# Patient Record
Sex: MALE | Race: WHITE | NOT HISPANIC OR LATINO | Employment: FULL TIME | URBAN - METROPOLITAN AREA
[De-identification: names, ages, dates, MRNs, and addresses within clinical notes are randomized per-mention and may not be internally consistent; named-entity substitution may affect disease eponyms.]

---

## 2017-06-01 ENCOUNTER — APPOINTMENT (OUTPATIENT)
Dept: RADIOLOGY | Facility: MEDICAL CENTER | Age: 32
End: 2017-06-01
Attending: EMERGENCY MEDICINE
Payer: MEDICAID

## 2017-06-01 ENCOUNTER — HOSPITAL ENCOUNTER (EMERGENCY)
Facility: MEDICAL CENTER | Age: 32
End: 2017-06-01
Attending: EMERGENCY MEDICINE
Payer: MEDICAID

## 2017-06-01 VITALS
SYSTOLIC BLOOD PRESSURE: 120 MMHG | HEART RATE: 42 BPM | TEMPERATURE: 98.6 F | BODY MASS INDEX: 27.93 KG/M2 | OXYGEN SATURATION: 97 % | RESPIRATION RATE: 14 BRPM | HEIGHT: 74 IN | DIASTOLIC BLOOD PRESSURE: 72 MMHG | WEIGHT: 217.59 LBS

## 2017-06-01 DIAGNOSIS — H54.7 VISUAL LOSS: ICD-10-CM

## 2017-06-01 DIAGNOSIS — R55 SYNCOPE, UNSPECIFIED SYNCOPE TYPE: ICD-10-CM

## 2017-06-01 DIAGNOSIS — Z86.69 HISTORY OF MIGRAINE: ICD-10-CM

## 2017-06-01 LAB
ALBUMIN SERPL BCP-MCNC: 4.6 G/DL (ref 3.2–4.9)
ALBUMIN/GLOB SERPL: 1.4 G/DL
ALP SERPL-CCNC: 62 U/L (ref 30–99)
ALT SERPL-CCNC: 17 U/L (ref 2–50)
AMPHET UR QL SCN: NEGATIVE
ANION GAP SERPL CALC-SCNC: 8 MMOL/L (ref 0–11.9)
APPEARANCE UR: CLEAR
AST SERPL-CCNC: 16 U/L (ref 12–45)
BARBITURATES UR QL SCN: NEGATIVE
BASOPHILS # BLD AUTO: 1 % (ref 0–1.8)
BASOPHILS # BLD: 0.07 K/UL (ref 0–0.12)
BENZODIAZ UR QL SCN: NEGATIVE
BILIRUB SERPL-MCNC: 0.8 MG/DL (ref 0.1–1.5)
BILIRUB UR QL STRIP.AUTO: NEGATIVE
BUN SERPL-MCNC: 11 MG/DL (ref 8–22)
BZE UR QL SCN: NEGATIVE
CALCIUM SERPL-MCNC: 9.7 MG/DL (ref 8.5–10.5)
CANNABINOIDS UR QL SCN: POSITIVE
CHLORIDE SERPL-SCNC: 106 MMOL/L (ref 96–112)
CO2 SERPL-SCNC: 25 MMOL/L (ref 20–33)
COLOR UR: YELLOW
CREAT SERPL-MCNC: 0.92 MG/DL (ref 0.5–1.4)
CRP SERPL HS-MCNC: 0.8 MG/L (ref 0–7.5)
CULTURE IF INDICATED INDCX: NO UA CULTURE
EOSINOPHIL # BLD AUTO: 0.14 K/UL (ref 0–0.51)
EOSINOPHIL NFR BLD: 2.1 % (ref 0–6.9)
ERYTHROCYTE [DISTWIDTH] IN BLOOD BY AUTOMATED COUNT: 37.8 FL (ref 35.9–50)
ERYTHROCYTE [SEDIMENTATION RATE] IN BLOOD BY WESTERGREN METHOD: 5 MM/HOUR (ref 0–15)
GFR SERPL CREATININE-BSD FRML MDRD: >60 ML/MIN/1.73 M 2
GLOBULIN SER CALC-MCNC: 3.3 G/DL (ref 1.9–3.5)
GLUCOSE SERPL-MCNC: 89 MG/DL (ref 65–99)
GLUCOSE UR STRIP.AUTO-MCNC: NEGATIVE MG/DL
HCT VFR BLD AUTO: 48.5 % (ref 42–52)
HGB BLD-MCNC: 17.7 G/DL (ref 14–18)
IMM GRANULOCYTES # BLD AUTO: 0.01 K/UL (ref 0–0.11)
IMM GRANULOCYTES NFR BLD AUTO: 0.1 % (ref 0–0.9)
KETONES UR STRIP.AUTO-MCNC: NEGATIVE MG/DL
LEUKOCYTE ESTERASE UR QL STRIP.AUTO: NEGATIVE
LV EJECT FRACT  99904: 60
LV EJECT FRACT MOD 2C 99903: 57.93
LV EJECT FRACT MOD 4C 99902: 64.14
LV EJECT FRACT MOD BP 99901: 61.27
LYMPHOCYTES # BLD AUTO: 1.81 K/UL (ref 1–4.8)
LYMPHOCYTES NFR BLD: 26.6 % (ref 22–41)
MCH RBC QN AUTO: 32 PG (ref 27–33)
MCHC RBC AUTO-ENTMCNC: 36.5 G/DL (ref 33.7–35.3)
MCV RBC AUTO: 87.7 FL (ref 81.4–97.8)
MDMA UR QL SCN: NEGATIVE
METHADONE UR QL SCN: NEGATIVE
MICRO URNS: NORMAL
MONOCYTES # BLD AUTO: 0.56 K/UL (ref 0–0.85)
MONOCYTES NFR BLD AUTO: 8.2 % (ref 0–13.4)
NEUTROPHILS # BLD AUTO: 4.22 K/UL (ref 1.82–7.42)
NEUTROPHILS NFR BLD: 62 % (ref 44–72)
NITRITE UR QL STRIP.AUTO: NEGATIVE
NRBC # BLD AUTO: 0 K/UL
NRBC BLD AUTO-RTO: 0 /100 WBC
OPIATES UR QL SCN: NEGATIVE
OXYCODONE UR QL SCN: NEGATIVE
PCP UR QL SCN: NEGATIVE
PH UR STRIP.AUTO: 6 [PH]
PLATELET # BLD AUTO: 238 K/UL (ref 164–446)
PMV BLD AUTO: 9.8 FL (ref 9–12.9)
POTASSIUM SERPL-SCNC: 3.9 MMOL/L (ref 3.6–5.5)
PROPOXYPH UR QL SCN: NEGATIVE
PROT SERPL-MCNC: 7.9 G/DL (ref 6–8.2)
PROT UR QL STRIP: NEGATIVE MG/DL
RBC # BLD AUTO: 5.53 M/UL (ref 4.7–6.1)
RBC UR QL AUTO: NEGATIVE
SODIUM SERPL-SCNC: 139 MMOL/L (ref 135–145)
SP GR UR STRIP.AUTO: 1.01
WBC # BLD AUTO: 6.8 K/UL (ref 4.8–10.8)

## 2017-06-01 PROCEDURE — 81003 URINALYSIS AUTO W/O SCOPE: CPT

## 2017-06-01 PROCEDURE — 86141 C-REACTIVE PROTEIN HS: CPT

## 2017-06-01 PROCEDURE — 93306 TTE W/DOPPLER COMPLETE: CPT | Mod: 26 | Performed by: INTERNAL MEDICINE

## 2017-06-01 PROCEDURE — 80053 COMPREHEN METABOLIC PANEL: CPT

## 2017-06-01 PROCEDURE — 93306 TTE W/DOPPLER COMPLETE: CPT

## 2017-06-01 PROCEDURE — 99284 EMERGENCY DEPT VISIT MOD MDM: CPT

## 2017-06-01 PROCEDURE — 70544 MR ANGIOGRAPHY HEAD W/O DYE: CPT

## 2017-06-01 PROCEDURE — 85025 COMPLETE CBC W/AUTO DIFF WBC: CPT

## 2017-06-01 PROCEDURE — 70547 MR ANGIOGRAPHY NECK W/O DYE: CPT

## 2017-06-01 PROCEDURE — 85652 RBC SED RATE AUTOMATED: CPT

## 2017-06-01 PROCEDURE — 36415 COLL VENOUS BLD VENIPUNCTURE: CPT

## 2017-06-01 PROCEDURE — 80307 DRUG TEST PRSMV CHEM ANLYZR: CPT

## 2017-06-01 ASSESSMENT — ENCOUNTER SYMPTOMS
HEADACHES: 1
FEVER: 0
TINGLING: 0
ABDOMINAL PAIN: 0
NAUSEA: 0
FOCAL WEAKNESS: 0
VOMITING: 0
FALLS: 1
DIARRHEA: 0

## 2017-06-01 ASSESSMENT — PAIN SCALES - GENERAL: PAINLEVEL_OUTOF10: 8

## 2017-06-01 NOTE — ED PROVIDER NOTES
"ED Provider Note    Scribed for Sanford Paul M.D. by Eric Hassan. 6/1/2017, 12:30 PM.    Primary care provider: Pcp Pt States None  Means of arrival: Walk In  History obtained from: Patient  History limited by: None     CHIEF COMPLAINT  Chief Complaint   Patient presents with   • Visual Problems     pt reports that he lost vision in left eye for unknown amount of time 1.5 hours ago. pt states that he has migraines got one, fell over and hit his head, +LOC for unknown amount of time.      HPI  Ronny MURPHY is a 31 y.o. male who presents to the Emergency Department complaining of headache. The patient had an onset of right sided headache when he woke up this morning at 9 AM. Patient rates his headache as being moderate in severity and constant since onset. The patient reports a chronic history of similar headaches, he estimates he has about 3 headaches per day every day. The patient reports he was walking in his house when he experienced a syncopal episode at 11 AM this morning. He experienced a ground level fall secondary to his syncopal episode, and states he hit the left side of his head when he fell. Patient has no complaints of pain resulting from his fall after his syncopal episode. Patient experienced loss of vision in his left eye that lasted for approximately \"5 seconds or 5 minutes\" after his syncopal episode. Patient states before the loss of vision he lost the ability to distinguish color then things like 3-D then loss of vision. Patient notes history of vision loss in the past but more television. Patient states his vision was if he was very drunk. Nose he did not drink last evening. Currently, patient states his left eye vision is at baseline. The patient denies any fever, congestion, nausea, vomiting, diarrhea, abdominal pain, focal weakness, tingling.     The patient notes a remote CT scan. He has been having headaches since age of 11 when he had atraumatic injury to his right globe with " "loss of vision. Patient's mother has history of migraines. Patient has not seen a neurologist in spite of the frequency of his headaches.    Review of old medical records shows evaluation of abdominal pain in September of last year. Evaluation for a abdominal pain as well with outpatient management in August. 2 previous evaluations for dental pain. Patient was seen for abdominal pain and discharged 2014.    REVIEW OF SYSTEMS  Review of Systems   Constitutional: Negative for fever.   HENT: Negative for congestion.    Eyes:        Loss of vision in left eye    Gastrointestinal: Negative for nausea, vomiting, abdominal pain and diarrhea.   Musculoskeletal: Positive for falls.   Neurological: Positive for headaches. Negative for tingling and focal weakness.        Syncope    All other systems reviewed and are negative.        PAST MEDICAL HISTORY   has a past medical history of Asthma.    SURGICAL HISTORY  patient denies any surgical history    SOCIAL HISTORY  Social History   Substance Use Topics   • Smoking status: Current Some Day Smoker -- 0.50 packs/day for 19 years     Types: Cigarettes   • Smokeless tobacco: Never Used   • Alcohol Use: No      History   Drug Use No     FAMILY HISTORY  No history pertinent to complaint.     CURRENT MEDICATIONS  Home Medications     **Home medications have not yet been reviewed for this encounter**          ALLERGIES  No Known Allergies    PHYSICAL EXAM  VITAL SIGNS: /78 mmHg  Pulse 55  Temp(Src) 36.3 °C (97.3 °F) (Temporal)  Resp 16  Ht 1.88 m (6' 2.02\")  Wt 98.7 kg (217 lb 9.5 oz)  BMI 27.93 kg/m2  SpO2 97%    Constitutional: Well developed, Well nourished, No acute distress, Non-toxic appearance.   HENT: Normocephalic, Atraumatic, Bilateral external ears normal, Oropharynx moist, no evidence of dehydration, No oral exudates, Nose normal.   Eyes: Right eye deviated laterally, Conjunctiva normal, No discharge.   Neck: Normal range of motion, No tenderness, Supple, No " stridor. No masses. No evidence of meningitis or meningismus.   Lymphatic: No lymphadenopathy noted.   Cardiovascular: Normal heart rate, Normal rhythm, No murmurs, No rubs, No gallops.   Thorax & Lungs: Normal breath sounds, No respiratory distress, No wheezing or rhonchi, No chest tenderness.   Abdomen: Bowel sounds normal, Soft, No tenderness, No masses, No pulsatile masses. No guarding or rebound. No evidence of peritoneal findings.  Skin: Warm, Dry, No erythema, No rash. No exanthem.   Extremities:  No edema, No tenderness, No cyanosis, No clubbing.   Musculoskeletal: Good range of motion in all major joints. No major deformities noted.   Neurologic: Alert & oriented x 3, Normal motor function, No focal deficits noted. Disconjugate right eye at baseline. EOMs intact with testing. Fundi benign. Blind right eye.  Psychiatric: Affect normal, mood normal.                                                              DIAGNOSTIC STUDIES / PROCEDURES    LABS  Results for orders placed or performed during the hospital encounter of 06/01/17   CBC WITH DIFFERENTIAL   Result Value Ref Range    WBC 6.8 4.8 - 10.8 K/uL    RBC 5.53 4.70 - 6.10 M/uL    Hemoglobin 17.7 14.0 - 18.0 g/dL    Hematocrit 48.5 42.0 - 52.0 %    MCV 87.7 81.4 - 97.8 fL    MCH 32.0 27.0 - 33.0 pg    MCHC 36.5 (H) 33.7 - 35.3 g/dL    RDW 37.8 35.9 - 50.0 fL    Platelet Count 238 164 - 446 K/uL    MPV 9.8 9.0 - 12.9 fL    Neutrophils-Polys 62.00 44.00 - 72.00 %    Lymphocytes 26.60 22.00 - 41.00 %    Monocytes 8.20 0.00 - 13.40 %    Eosinophils 2.10 0.00 - 6.90 %    Basophils 1.00 0.00 - 1.80 %    Immature Granulocytes 0.10 0.00 - 0.90 %    Nucleated RBC 0.00 /100 WBC    Neutrophils (Absolute) 4.22 1.82 - 7.42 K/uL    Lymphs (Absolute) 1.81 1.00 - 4.80 K/uL    Monos (Absolute) 0.56 0.00 - 0.85 K/uL    Eos (Absolute) 0.14 0.00 - 0.51 K/uL    Baso (Absolute) 0.07 0.00 - 0.12 K/uL    Immature Granulocytes (abs) 0.01 0.00 - 0.11 K/uL    NRBC (Absolute) 0.00  K/uL   COMP METABOLIC PANEL   Result Value Ref Range    Sodium 139 135 - 145 mmol/L    Potassium 3.9 3.6 - 5.5 mmol/L    Chloride 106 96 - 112 mmol/L    Co2 25 20 - 33 mmol/L    Anion Gap 8.0 0.0 - 11.9    Glucose 89 65 - 99 mg/dL    Bun 11 8 - 22 mg/dL    Creatinine 0.92 0.50 - 1.40 mg/dL    Calcium 9.7 8.5 - 10.5 mg/dL    AST(SGOT) 16 12 - 45 U/L    ALT(SGPT) 17 2 - 50 U/L    Alkaline Phosphatase 62 30 - 99 U/L    Total Bilirubin 0.8 0.1 - 1.5 mg/dL    Albumin 4.6 3.2 - 4.9 g/dL    Total Protein 7.9 6.0 - 8.2 g/dL    Globulin 3.3 1.9 - 3.5 g/dL    A-G Ratio 1.4 g/dL   URINALYSIS CULTURE, IF INDICATED   Result Value Ref Range    Color Yellow     Character Clear     Specific Gravity 1.010 <1.035    Ph 6.0 5.0-8.0    Glucose Negative Negative mg/dL    Ketones Negative Negative mg/dL    Protein Negative Negative mg/dL    Bilirubin Negative Negative    Nitrite Negative Negative    Leukocyte Esterase Negative Negative    Occult Blood Negative Negative    Micro Urine Req see below     Culture Indicated No UA Culture   URINE DRUG SCREEN   Result Value Ref Range    Amphetamines Urine Negative Negative    Barbiturates Negative Negative    Benzodiazepines Negative Negative    Cocaine Metabolite Negative Negative    Methadone Negative Negative    Ecstasy Negative Negative    Opiates Negative Negative    Oxycodone Negative Negative    Phencyclidine -Pcp Negative Negative    Propoxyphene Negative Negative    Cannabinoid Metab Positive (A) Negative   WESTERGREN SED RATE   Result Value Ref Range    Sed Rate Westergren 5 0 - 15 mm/hour   CRP HIGH SENSITIVE (CARDIAC)   Result Value Ref Range    C Reactive Protein High Sensitive 0.8 0.0 - 7.5 mg/L   ESTIMATED GFR   Result Value Ref Range    GFR If African American >60 >60 mL/min/1.73 m 2    GFR If Non African American >60 >60 mL/min/1.73 m 2   ECHOCARDIOGRAM COMP W/O CONT   Result Value Ref Range    Eject.Frac. MOD BP 61.27     Eject.Frac. MOD 4C 64.14     Eject.Frac. MOD 2C 57.93      Left Ventrical Ejection Fraction 60       All labs reviewed by me.       RADIOLOGY  ECHOCARDIOGRAM COMP W/O CONT   Final Result      MR-MRA NECK-W/O   Final Result   Addendum 1 of 1   Addendum:   The supra and infratentorial brain parenchyma appear normal on axial    T2-weighted sequences. The visualized flow voids of the cerebral vessels    are unremarkable. A Byron cisterna magna is seen.      Final      There is no stenosis in the visualized extracranial neck arteries.      MR-VENOGRAM (MRV) HEAD   Final Result      There is no evidence of deep venous sinus thrombosis.          The radiologist's interpretation of all radiological studies have been reviewed by me.    COURSE & MEDICAL DECISION MAKING  Nursing notes, VS, PMSFHx reviewed in chart.    Review of old medical records shows visit in September of last year for generalized abdominal pain. Evaluation for abdominal pain and diarrhea of the month before. Patient was seen for dental pain 2015 and abdominal pain 2014. Seen for facial swelling and dental abscess in 2013.    12:30 PM - Patient seen and examined at bedside. . Ordered MR MRA Head, MR MRA Neck, westergren sed rate, CRP, urine drug screen, CBC, CMP, urinalysis to evaluate his symptoms. The differential diagnoses include but are not limited to: Temporal arteritis, atypical migraine, embolic event, amaurosis fugax.    3:09 PM Spoke with Dr. Sousa, Cardiology, about the patient's condition. Dr. Sousa agrees patient be evaluated by EEG.      3:55 PM Discussed with patient admission for consultation. Possible need for anticoagulation. Significant abnormality and MRI waiting echo.    Patient states he cannot be admitted as there is no one to care for his son.    4:15 PM discussed case with Dr. Yi. He agreed with MRI. As he has had occurred after he struck his head he believes most likely ocular.    4:16 called ophthalmology for consultation.    4:23 PM Spoke with Dr. Zheng, Opthalmology, about  the patient's condition. He will contact the patient and patient will follow-up in the office.    I discussed need for admission to workup syncope. Patient did have consultation with ophthalmology and neurology over the phone. Patient agrees to return if return of symptoms. Patient is aware of my concern the patient's had difficulty with his vision is only one remaining eye.    Another possibility is complex migraine as he has had visual disturbance and tunnel vision in the past with his left eye.    Patient will be discharged AMA. Patient agrees to return if return of symptoms. Close ophthalmology follow-up. No evidence of occlusion to the carotid artery or abnormality on echocardiogram.      FINAL IMPRESSION  1. Visual loss    2. History of migraine    3. Syncope, unspecified syncope type            I, Eric Hassan (Scribe), am scribing for, and in the presence of, Sanford Paul M.D..    Electronically signed by: Eric Hassan (Jacquiibe), 6/1/2017    ISanford M.D. personally performed the services described in this documentation, as scribed by Eric Hassan in my presence, and it is both accurate and complete.    The note accurately reflects work and decisions made by me.  Sanford Paul  6/1/2017  4:38 PM

## 2017-06-01 NOTE — ED AVS SNAPSHOT
"[employer] Access Code: [email or web address]  Expires: 7/1/2017  4:45 PM    "[employer]  A secure, online tool to manage your health information     [employer]’s "[employer]® is a secure, online tool that connects you to your personalized health information from the privacy of your home -- day or night - making it very easy for you to manage your healthcare. Once the activation process is completed, you can even access your medical information using the "[employer] waldo, which is available for free in the Apple Waldo store or Google Play store.     "[employer] provides the following levels of access (as shown below):   My Chart Features   [facility] Primary Care Doctor [facility]  Specialists [facility]  Urgent  Care Non-[facility]  Primary Care  Doctor   Email your healthcare team securely and privately 24/7 X X X X   Manage appointments: schedule your next appointment; view details of past/upcoming appointments X      Request prescription refills. X      View recent personal medical records, including lab and immunizations X X X X   View health record, including health history, allergies, medications X X X X   Read reports about your outpatient visits, procedures, consult and ER notes X X X X   See your discharge summary, which is a recap of your hospital and/or ER visit that includes your diagnosis, lab results, and care plan. X X       How to register for "[employer]:  1. Go to  [email or web address].  2. Click on the Sign Up Now box, which takes you to the New Member Sign Up page. You will need to provide the following information:  a. Enter your "[employer] Access Code exactly as it appears at the top of this page. (You will not need to use this code after you’ve completed the sign-up process. If you do not sign up before the expiration date, you must request a new code.)   b. Enter your date of birth.   c. Enter your home email address.   d. Click Submit, and follow the next screen’s instructions.  3. Create a "[employer] ID. This will be your "[employer]  login ID and cannot be changed, so think of one that is secure and easy to remember.  4. Create a [employer] password. You can change your password at any time.  5. Enter your Password Reset Question and Answer. This can be used at a later time if you forget your password.   6. Enter your e-mail address. This allows you to receive e-mail notifications when new information is available in [employer].  7. Click Sign Up. You can now view your health information.    For assistance activating your [employer] account, call [phone]

## 2017-06-01 NOTE — ED AVS SNAPSHOT
6/1/2017    Ronny MURPHY  [address] [facility] [address]    Dear Ronny:    [facility] wants to ensure your discharge home is safe and you or your loved ones have had all of your questions answered regarding your care after you leave the hospital.    Below is a list of resources and contact information should you have any questions regarding your hospital stay, follow-up instructions, or active medical symptoms.    Questions or Concerns Regarding… Contact   Medical Questions Related to Your Discharge  (7 days a week, 8am-5pm) Contact a Nurse Care Coordinator   [phone]   Medical Questions Not Related to Your Discharge  (24 hours a day / 7 days a week)  Contact the Nurse Health Line   [phone]    Medications or Discharge Instructions Refer to your discharge packet   or contact your [facility] Primary Care Provider   [phone]   Follow-up Appointment(s) Schedule your appointment via [employer]   or contact Scheduling [phone]   Billing Review your statement via [employer]  or contact Billing [phone]   Medical Records Review your records via [employer]   or contact Medical Records [phone]     You may receive a telephone call within two days of discharge. This call is to make certain you understand your discharge instructions and have the opportunity to have any questions answered. You can also easily access your medical information, test results and upcoming appointments via the [employer] free online health management tool. You can learn more and sign up at [email or web address]. For assistance setting up your [employer] account, please call [phone].    Once again, we want to ensure your discharge home is safe and that you have a clear understanding of any next steps in your care. If you have any questions or concerns, please do not hesitate to contact us, we are here for you. Thank you for choosing [facility] for your healthcare needs.    Sincerely,    Your [facility] Healthcare Team

## 2017-06-01 NOTE — ED NOTES
Pt denies questions.  SL removed.  Verbalizes understanding of dc and f/u instructions. Released amb to self.

## 2017-06-01 NOTE — ED NOTES
"Chief Complaint   Patient presents with   • Visual Problems     pt reports that he lost vision in left eye for unknown amount of time 1.5 hours ago. pt states that he has migraines got one, fell over and hit his head, +LOC for unknown amount of time.      Pt wearing dark glasses, reports current head pain 8/10.  GCS 15.   Blood pressure 129/78, pulse 55, temperature 36.3 °C (97.3 °F), temperature source Temporal, resp. rate 16, height 1.88 m (6' 2.02\"), weight 98.7 kg (217 lb 9.5 oz), SpO2 97 %.  Pt back to 67.   "

## 2017-06-01 NOTE — ED AVS SNAPSHOT
Home Care Instructions                                                                                                                Ronny MURPHY   MRN: [id number]    Department:  [facility], Emergency Dept   Date of Visit:  6/1/2017            [facility], Emergency Dept    [address]    [facility] [address]    Phone:  [phone]      You were seen by     Sanford Paul M.D.      Your Diagnosis Was     Visual loss     H54.7       Follow-up Information     1. Follow up with Kameron Correa M.D.. Schedule an appointment as soon as possible for a visit in 1 day.    Specialty:  Ophthalmology    Contact information    [id number]  [facility] [address]  [phone]        Medication Information     Review all of your home medications and newly ordered medications with your primary doctor and/or pharmacist as soon as possible. Follow medication instructions as directed by your doctor and/or pharmacist.     Please keep your complete medication list with you and share with your physician. Update the information when medications are discontinued, doses are changed, or new medications (including over-the-counter products) are added; and carry medication information at all times in the event of emergency situations.               Medication List      ASK your doctor about these medications        Instructions    Morning Afternoon Evening Bedtime    loperamide 2 MG Caps   Commonly known as:  IMODIUM        Take 1 Cap by mouth 4 times a day as needed for Diarrhea.   Dose:  2 mg                        ondansetron 4 MG Tbdp   Commonly known as:  ZOFRAN ODT        Take 1 Tab by mouth every 8 hours as needed for Nausea/Vomiting.   Dose:  4 mg                                Procedures and tests performed during your visit     CBC WITH DIFFERENTIAL    COMP METABOLIC PANEL    CRP HIGH SENSITIVE (CARDIAC)    ECHOCARDIOGRAM COMP W/O CONT    ESTIMATED GFR    MR-MRA NECK-W/O    MR-VENOGRAM (MRV) HEAD    URINALYSIS CULTURE, IF INDICATED    URINE DRUG SCREEN    WESTERGREN SED RATE            Patient Information     Patient Information    Following emergency treatment: all patient requiring follow-up care must return either to a private physician or a clinic if your condition worsens before you are able to obtain further medical attention, please return to the emergency room.     Billing Information    At [facility], we work to make the billing process streamlined for our patients.  Our Representatives are here to answer any questions you may have regarding your hospital bill.  If you have insurance coverage and have supplied your insurance information to us, we will submit a claim to your insurer on your behalf.  Should you have any questions regarding your bill, we can be reached online or by phone as follows:  Online: You are able pay your bills online or live chat with our representatives about any billing questions you may have. We are here to help Monday - Friday from 8:00am to 7:30pm and 9:00am - 12:00pm on Saturdays.  Please visit [email or web address]  for more information.   Phone:  [phone] or [phone]    Please note that your emergency physician, surgeon, pathologist, radiologist, anesthesiologist, and other specialists are not employed by [facility] and will therefore bill separately for their services.  Please contact them directly for any questions concerning their bills at the numbers below:     Emergency Physician Services:  [phone]  [facility]:  [phone]  [facility]:  [phone]  [facility]:  [phone]    1. Your final bill may vary from the amount quoted upon discharge if all procedures are not complete at that time, or if your doctor has additional procedures of which we are not aware. You will receive an additional bill if you return to the Emergency Department at  [facility] for suture removal regardless of the facility of which the sutures were placed.     2. Please arrange for settlement of this account at the emergency registration.    3. All self-pay accounts are due in full at the time of treatment.  If you are unable to meet this obligation then payment is expected within 4-5 days.     4. If you have had radiology studies (CT, X-ray, Ultrasound, MRI), you have received a preliminary result during your emergency department visit. Please contact the radiology department [phone] to receive a copy of your final result. Please discuss the Final result with your primary physician or with the follow up physician provided.     Crisis Hotline:  [place] Crisis Hotline:  5-549-LNIHCJH or 1-805.437.2930  [place] Crisis Hotline:    1-924.858.6342 or 605-888-5013         ED Discharge Follow Up Questions    1. In order to provide you with very good care, we would like to follow up with a phone call in the next few days.  May we have your permission to contact you?     YES /  NO    2. What is the best phone number to call you? (       )_____-__________    3. What is the best time to call you?      Morning  /  Afternoon  /  Evening                   Patient Signature:  ____________________________________________________________    Date:  ____________________________________________________________

## 2018-12-04 ENCOUNTER — HOSPITAL ENCOUNTER (EMERGENCY)
Dept: HOSPITAL 8 - ED | Age: 33
Discharge: HOME | End: 2018-12-04
Payer: COMMERCIAL

## 2018-12-04 VITALS — BODY MASS INDEX: 27.01 KG/M2 | HEIGHT: 74 IN | WEIGHT: 210.43 LBS

## 2018-12-04 VITALS — DIASTOLIC BLOOD PRESSURE: 52 MMHG | SYSTOLIC BLOOD PRESSURE: 127 MMHG

## 2018-12-04 DIAGNOSIS — J45.31: Primary | ICD-10-CM

## 2018-12-04 DIAGNOSIS — J00: ICD-10-CM

## 2018-12-04 PROCEDURE — 99283 EMERGENCY DEPT VISIT LOW MDM: CPT

## 2018-12-04 PROCEDURE — 94640 AIRWAY INHALATION TREATMENT: CPT

## 2018-12-04 PROCEDURE — 71046 X-RAY EXAM CHEST 2 VIEWS: CPT

## 2024-07-15 ENCOUNTER — APPOINTMENT (OUTPATIENT)
Dept: RADIOLOGY | Facility: MEDICAL CENTER | Age: 39
End: 2024-07-15
Attending: EMERGENCY MEDICINE

## 2024-07-15 ENCOUNTER — HOSPITAL ENCOUNTER (EMERGENCY)
Facility: MEDICAL CENTER | Age: 39
End: 2024-07-15
Attending: EMERGENCY MEDICINE
Payer: MEDICAID

## 2024-07-15 VITALS
HEIGHT: 74 IN | RESPIRATION RATE: 14 BRPM | WEIGHT: 223.77 LBS | HEART RATE: 74 BPM | TEMPERATURE: 98.1 F | DIASTOLIC BLOOD PRESSURE: 70 MMHG | SYSTOLIC BLOOD PRESSURE: 125 MMHG | OXYGEN SATURATION: 97 % | BODY MASS INDEX: 28.72 KG/M2

## 2024-07-15 DIAGNOSIS — R07.9 ACUTE CHEST PAIN: ICD-10-CM

## 2024-07-15 LAB
ALBUMIN SERPL BCP-MCNC: 4.6 G/DL (ref 3.2–4.9)
ALBUMIN/GLOB SERPL: 1.7 G/DL
ALP SERPL-CCNC: 76 U/L (ref 30–99)
ALT SERPL-CCNC: 25 U/L (ref 2–50)
ANION GAP SERPL CALC-SCNC: 13 MMOL/L (ref 7–16)
AST SERPL-CCNC: 18 U/L (ref 12–45)
BASOPHILS # BLD AUTO: 0.8 % (ref 0–1.8)
BASOPHILS # BLD: 0.06 K/UL (ref 0–0.12)
BILIRUB SERPL-MCNC: 0.6 MG/DL (ref 0.1–1.5)
BUN SERPL-MCNC: 12 MG/DL (ref 8–22)
CALCIUM ALBUM COR SERPL-MCNC: 8.6 MG/DL (ref 8.5–10.5)
CALCIUM SERPL-MCNC: 9.1 MG/DL (ref 8.5–10.5)
CHLORIDE SERPL-SCNC: 106 MMOL/L (ref 96–112)
CO2 SERPL-SCNC: 23 MMOL/L (ref 20–33)
CREAT SERPL-MCNC: 0.93 MG/DL (ref 0.5–1.4)
EKG IMPRESSION: NORMAL
EOSINOPHIL # BLD AUTO: 0.13 K/UL (ref 0–0.51)
EOSINOPHIL NFR BLD: 1.7 % (ref 0–6.9)
ERYTHROCYTE [DISTWIDTH] IN BLOOD BY AUTOMATED COUNT: 39.5 FL (ref 35.9–50)
GFR SERPLBLD CREATININE-BSD FMLA CKD-EPI: 108 ML/MIN/1.73 M 2
GLOBULIN SER CALC-MCNC: 2.7 G/DL (ref 1.9–3.5)
GLUCOSE SERPL-MCNC: 98 MG/DL (ref 65–99)
HCT VFR BLD AUTO: 45.6 % (ref 42–52)
HGB BLD-MCNC: 16.3 G/DL (ref 14–18)
IMM GRANULOCYTES # BLD AUTO: 0.02 K/UL (ref 0–0.11)
IMM GRANULOCYTES NFR BLD AUTO: 0.3 % (ref 0–0.9)
LYMPHOCYTES # BLD AUTO: 1.83 K/UL (ref 1–4.8)
LYMPHOCYTES NFR BLD: 24.6 % (ref 22–41)
MCH RBC QN AUTO: 32.5 PG (ref 27–33)
MCHC RBC AUTO-ENTMCNC: 35.7 G/DL (ref 32.3–36.5)
MCV RBC AUTO: 90.8 FL (ref 81.4–97.8)
MONOCYTES # BLD AUTO: 0.49 K/UL (ref 0–0.85)
MONOCYTES NFR BLD AUTO: 6.6 % (ref 0–13.4)
NEUTROPHILS # BLD AUTO: 4.91 K/UL (ref 1.82–7.42)
NEUTROPHILS NFR BLD: 66 % (ref 44–72)
NRBC # BLD AUTO: 0 K/UL
NRBC BLD-RTO: 0 /100 WBC (ref 0–0.2)
PLATELET # BLD AUTO: 256 K/UL (ref 164–446)
PMV BLD AUTO: 9.3 FL (ref 9–12.9)
POTASSIUM SERPL-SCNC: 4.3 MMOL/L (ref 3.6–5.5)
PROT SERPL-MCNC: 7.3 G/DL (ref 6–8.2)
RBC # BLD AUTO: 5.02 M/UL (ref 4.7–6.1)
SODIUM SERPL-SCNC: 142 MMOL/L (ref 135–145)
TROPONIN T SERPL-MCNC: <6 NG/L (ref 6–19)
TROPONIN T SERPL-MCNC: <6 NG/L (ref 6–19)
WBC # BLD AUTO: 7.4 K/UL (ref 4.8–10.8)

## 2024-07-15 PROCEDURE — 93005 ELECTROCARDIOGRAM TRACING: CPT | Performed by: EMERGENCY MEDICINE

## 2024-07-15 PROCEDURE — 85025 COMPLETE CBC W/AUTO DIFF WBC: CPT

## 2024-07-15 PROCEDURE — 84484 ASSAY OF TROPONIN QUANT: CPT

## 2024-07-15 PROCEDURE — 99284 EMERGENCY DEPT VISIT MOD MDM: CPT

## 2024-07-15 PROCEDURE — 71045 X-RAY EXAM CHEST 1 VIEW: CPT

## 2024-07-15 PROCEDURE — A9270 NON-COVERED ITEM OR SERVICE: HCPCS | Performed by: EMERGENCY MEDICINE

## 2024-07-15 PROCEDURE — 700102 HCHG RX REV CODE 250 W/ 637 OVERRIDE(OP): Performed by: EMERGENCY MEDICINE

## 2024-07-15 PROCEDURE — 36415 COLL VENOUS BLD VENIPUNCTURE: CPT

## 2024-07-15 PROCEDURE — 80053 COMPREHEN METABOLIC PANEL: CPT

## 2024-07-15 PROCEDURE — 93005 ELECTROCARDIOGRAM TRACING: CPT

## 2024-07-15 RX ORDER — FAMOTIDINE 20 MG/1
20 TABLET, FILM COATED ORAL ONCE
Status: COMPLETED | OUTPATIENT
Start: 2024-07-15 | End: 2024-07-15

## 2024-07-15 RX ADMIN — LIDOCAINE HYDROCHLORIDE 30 ML: 20 SOLUTION ORAL; TOPICAL at 14:31

## 2024-07-15 RX ADMIN — FAMOTIDINE 20 MG: 20 TABLET, FILM COATED ORAL at 14:31

## 2024-07-15 ASSESSMENT — PAIN DESCRIPTION - PAIN TYPE: TYPE: ACUTE PAIN

## 2024-07-15 NOTE — ED PROVIDER NOTES
"ED Provider Note    CHIEF COMPLAINT  Chief Complaint   Patient presents with    Chest Pain     X 3 days: central chest tightness radiating to left side and down left arm with intermittent dizziness. Denies SOB or nausea.        EXTERNAL RECORDS REVIEWED  Other he was seen for asthma 6 years ago at [facility]  LIMITATION TO HISTORY   Select: : None      Ronny Duong is a 38 y.o. male who presents complaint of chest pain.  The last 3 days he has had a \"persistent\" sensation of heartburn in the center of his chest.  Today seen little bit worse was rating to the left arm.  He has never had this before.  Denies any pleuritic symptoms.  Does not describe a tearing sensation.  There is been no trips or travel.  No recent illness.  No leg pain or swelling.  He is otherwise healthy.  Although he does not exercise he does not describe any exertional symptoms.    PAST MEDICAL HISTORY   has a past medical history of Asthma.    SURGICAL HISTORY  patient denies any surgical history    FAMILY HISTORY  History reviewed. No pertinent family history.    SOCIAL HISTORY  Social History     Tobacco Use    Smoking status: Some Days     Current packs/day: 0.50     Average packs/day: 0.5 packs/day for 19.0 years (9.5 ttl pk-yrs)     Types: Cigarettes    Smokeless tobacco: Never   Substance and Sexual Activity    Alcohol use: No    Drug use: Yes     Comment: marijuana    Sexual activity: Not on file   Marijuana no other drugs.    CURRENT MEDICATIONS  Home Medications       Reviewed by Dianne Drew R.N. (Registered Nurse) on 07/15/24 at 1237  Med List Status: Not Addressed     Medication Last Dose Status   loperamide (IMODIUM) 2 MG Cap  Active   ondansetron (ZOFRAN ODT) 4 MG TABLET DISPERSIBLE  Active                  Audit from Redirected Encounters    **Home medications have not yet been reviewed for this encounter**         ALLERGIES  No Known Allergies    PHYSICAL EXAM  VITAL SIGNS: /70   Pulse 74   Temp " "36.7 °C (98.1 °F) (Temporal)   Resp 14   Ht 1.88 m (6' 2\")   Wt 101 kg (223 lb 12.3 oz)   SpO2 97%   BMI 28.73 kg/m²    Constitutional: Well appearing patient in no acute distress.  HENT: Head is without trauma.  Oropharynx is clear.  Mucous membranes are moist.  Eyes: Sclerae are nonicteric, pupils are equally round.  Neck: Supple with grossly normal range of motion.  Cardiovascular: Heart is regular rate and rhythm without murmur rub or gallop.  Peripheral pulses are intact and symmetric throughout.  Thorax & Lungs: Breathing easily.  Good air movement.  There is no wheeze, rhonchi or rales.  Abdomen: Bowel sounds normal, soft, non-distended, nontender, no mass nor pulsatile mass. I do not appreciate hepatosplenomegaly.  Skin: No apparent rash.  I do not see petechiae or purpura.  Extremities: No evidence of acute trauma.  No clubbing, cyanosis, edema, no Homans or cords.  Neurologic: Alert. Moving all extremities.  Intact sensation and strength throughout.  Gait is normal.  Psychiatric: Normal for situation      EKG/LABS  To my interpretation this shows a twelve-lead study with normal sinus rhythm at a rate of 65.  There are no ST segment or T wave changes.  Impression no STEMI  I have independently interpreted this EKG  Labs Reviewed   CBC WITH DIFFERENTIAL   COMP METABOLIC PANEL   TROPONIN   TROPONIN   ESTIMATED GFR         RADIOLOGY/PROCEDURES   I have independently interpreted the diagnostic imaging associated with this visit and am waiting the final reading from the radiologist.   My preliminary interpretation is as follows: No pneumothorax, no infiltrate    Radiologist interpretation:  DX-CHEST-PORTABLE (1 VIEW)   Final Result      No acute cardiopulmonary disease evident.          COURSE & MEDICAL DECISION MAKING    ASSESSMENT, COURSE AND PLAN  Care Narrative: This the patient presents with chest pain.  He had it for 3 days, but was worse today.  He does not describe pleuritic symptoms, I do not " suspect pulmonary embolism.  He is negative for PE by the PE rule out criteria.  This does not sound like aortic in etiology in terms of symptomatology.  Chest x-ray shows a normal mediastinum and aortic knob.  I gave him a GI cocktail and a dose of oral Pepcid.  This did not help with the symptoms.  His laboratories returned showing no leukocytosis or shift.  There is no hepatitis or electrolyte abnormalities.  His first troponin is negative.  With this, his heart score is heart score is calculated be low risk.  He has no risk factors that are known although we do not know his family history.    I obtain a second troponin which is also undetectable.  I have gone back into his room at 1750 to discuss the results of his test, and further workup, however it appears, unfortunately, that he has eloped.  I was unable to give him discharge instructions or talk about further treatment plans.    DISPOSITION AND DISCUSSIONS  Decision tools and prescription drugs considered including, but not limited to: PERC rule and heart score .    FINAL DIAGNOSIS  1. Acute chest pain           Electronically signed by: Ronni Rosas M.D., 7/15/2024 3:02 PM

## 2024-07-15 NOTE — ED NOTES
Pt ambulated to room 81 with steady gait. Pt placed on monitors. Chart up for ERP.     Pt has his phone from security looking for his numbers.      Dixon Thompson RN  05/04/19 [id number]

## 2024-07-15 NOTE — ED TRIAGE NOTES
"Chief Complaint   Patient presents with    Chest Pain     X 3 days: central chest tightness radiating to left side and down left arm with intermittent dizziness. Denies SOB or nausea.        39 yo M to triage for above complaint. CP protocol ordered.      Pt placed in lobby. Pt educated on triage process. Pt encouraged to alert staff for any changes.     Patient and staff wearing appropriate PPE    /70   Pulse 74   Temp 36.7 °C (98.1 °F) (Temporal)   Resp 14   Ht 1.88 m (6' 2\")   Wt 101 kg (223 lb 12.3 oz)   SpO2 97%   BMI 28.73 kg/m²     "

## 2024-07-16 NOTE — ED NOTES
PT departed prior to formal discharge. Gown left on bed, RN not alerted prior to departure. No discharge paperwork reviewed.

## 2025-04-26 ENCOUNTER — APPOINTMENT (OUTPATIENT)
Dept: RADIOLOGY | Facility: MEDICAL CENTER | Age: 40
End: 2025-04-26
Attending: EMERGENCY MEDICINE
Payer: MEDICAID

## 2025-04-26 ENCOUNTER — HOSPITAL ENCOUNTER (EMERGENCY)
Facility: MEDICAL CENTER | Age: 40
End: 2025-04-26
Attending: EMERGENCY MEDICINE
Payer: MEDICAID

## 2025-04-26 VITALS
OXYGEN SATURATION: 95 % | WEIGHT: 250 LBS | HEIGHT: 74 IN | BODY MASS INDEX: 32.08 KG/M2 | DIASTOLIC BLOOD PRESSURE: 71 MMHG | HEART RATE: 66 BPM | TEMPERATURE: 96.8 F | RESPIRATION RATE: 18 BRPM | SYSTOLIC BLOOD PRESSURE: 119 MMHG

## 2025-04-26 DIAGNOSIS — G89.29 CHRONIC CHEST WALL PAIN: ICD-10-CM

## 2025-04-26 DIAGNOSIS — R07.89 CHRONIC CHEST WALL PAIN: ICD-10-CM

## 2025-04-26 DIAGNOSIS — G44.86 CERVICOGENIC HEADACHE: ICD-10-CM

## 2025-04-26 LAB
ALBUMIN SERPL BCP-MCNC: 4.5 G/DL (ref 3.2–4.9)
ALBUMIN/GLOB SERPL: 1.5 G/DL
ALP SERPL-CCNC: 70 U/L (ref 30–99)
ALT SERPL-CCNC: 33 U/L (ref 2–50)
AMPHET UR QL SCN: NEGATIVE
ANION GAP SERPL CALC-SCNC: 12 MMOL/L (ref 7–16)
APPEARANCE UR: CLEAR
AST SERPL-CCNC: 26 U/L (ref 12–45)
BARBITURATES UR QL SCN: NEGATIVE
BASOPHILS # BLD AUTO: 1.1 % (ref 0–1.8)
BASOPHILS # BLD: 0.08 K/UL (ref 0–0.12)
BENZODIAZ UR QL SCN: NEGATIVE
BILIRUB SERPL-MCNC: 0.5 MG/DL (ref 0.1–1.5)
BILIRUB UR QL STRIP.AUTO: NEGATIVE
BUN SERPL-MCNC: 14 MG/DL (ref 8–22)
BZE UR QL SCN: NEGATIVE
CALCIUM ALBUM COR SERPL-MCNC: 8.4 MG/DL (ref 8.5–10.5)
CALCIUM SERPL-MCNC: 8.8 MG/DL (ref 8.5–10.5)
CANNABINOIDS UR QL SCN: POSITIVE
CHLORIDE SERPL-SCNC: 105 MMOL/L (ref 96–112)
CO2 SERPL-SCNC: 21 MMOL/L (ref 20–33)
COLOR UR: YELLOW
CREAT SERPL-MCNC: 1.1 MG/DL (ref 0.5–1.4)
EKG IMPRESSION: NORMAL
EOSINOPHIL # BLD AUTO: 0.14 K/UL (ref 0–0.51)
EOSINOPHIL NFR BLD: 1.9 % (ref 0–6.9)
ERYTHROCYTE [DISTWIDTH] IN BLOOD BY AUTOMATED COUNT: 38.3 FL (ref 35.9–50)
FENTANYL UR QL: NEGATIVE
GFR SERPLBLD CREATININE-BSD FMLA CKD-EPI: 88 ML/MIN/1.73 M 2
GLOBULIN SER CALC-MCNC: 3.1 G/DL (ref 1.9–3.5)
GLUCOSE SERPL-MCNC: 110 MG/DL (ref 65–99)
GLUCOSE UR STRIP.AUTO-MCNC: NEGATIVE MG/DL
HCT VFR BLD AUTO: 46.4 % (ref 42–52)
HGB BLD-MCNC: 16.6 G/DL (ref 14–18)
IMM GRANULOCYTES # BLD AUTO: 0.02 K/UL (ref 0–0.11)
IMM GRANULOCYTES NFR BLD AUTO: 0.3 % (ref 0–0.9)
KETONES UR STRIP.AUTO-MCNC: NEGATIVE MG/DL
LEUKOCYTE ESTERASE UR QL STRIP.AUTO: NEGATIVE
LYMPHOCYTES # BLD AUTO: 2.01 K/UL (ref 1–4.8)
LYMPHOCYTES NFR BLD: 27 % (ref 22–41)
MCH RBC QN AUTO: 31.6 PG (ref 27–33)
MCHC RBC AUTO-ENTMCNC: 35.8 G/DL (ref 32.3–36.5)
MCV RBC AUTO: 88.4 FL (ref 81.4–97.8)
METHADONE UR QL SCN: NEGATIVE
MICRO URNS: NORMAL
MONOCYTES # BLD AUTO: 0.58 K/UL (ref 0–0.85)
MONOCYTES NFR BLD AUTO: 7.8 % (ref 0–13.4)
NEUTROPHILS # BLD AUTO: 4.61 K/UL (ref 1.82–7.42)
NEUTROPHILS NFR BLD: 61.9 % (ref 44–72)
NITRITE UR QL STRIP.AUTO: NEGATIVE
NRBC # BLD AUTO: 0 K/UL
NRBC BLD-RTO: 0 /100 WBC (ref 0–0.2)
NT-PROBNP SERPL IA-MCNC: <36 PG/ML (ref 0–125)
OPIATES UR QL SCN: NEGATIVE
OXYCODONE UR QL SCN: NEGATIVE
PCP UR QL SCN: NEGATIVE
PH UR STRIP.AUTO: 5.5 [PH] (ref 5–8)
PLATELET # BLD AUTO: 277 K/UL (ref 164–446)
PMV BLD AUTO: 9.4 FL (ref 9–12.9)
POTASSIUM SERPL-SCNC: 3.9 MMOL/L (ref 3.6–5.5)
PROPOXYPH UR QL SCN: NEGATIVE
PROT SERPL-MCNC: 7.6 G/DL (ref 6–8.2)
PROT UR QL STRIP: NEGATIVE MG/DL
RBC # BLD AUTO: 5.25 M/UL (ref 4.7–6.1)
RBC UR QL AUTO: NEGATIVE
SODIUM SERPL-SCNC: 138 MMOL/L (ref 135–145)
SP GR UR STRIP.AUTO: 1.01
TROPONIN T SERPL-MCNC: <6 NG/L (ref 6–19)
UROBILINOGEN UR STRIP.AUTO-MCNC: 0.2 EU/DL
WBC # BLD AUTO: 7.4 K/UL (ref 4.8–10.8)

## 2025-04-26 PROCEDURE — 36415 COLL VENOUS BLD VENIPUNCTURE: CPT

## 2025-04-26 PROCEDURE — 83880 ASSAY OF NATRIURETIC PEPTIDE: CPT

## 2025-04-26 PROCEDURE — 80307 DRUG TEST PRSMV CHEM ANLYZR: CPT

## 2025-04-26 PROCEDURE — 99285 EMERGENCY DEPT VISIT HI MDM: CPT

## 2025-04-26 PROCEDURE — 96374 THER/PROPH/DIAG INJ IV PUSH: CPT

## 2025-04-26 PROCEDURE — 84484 ASSAY OF TROPONIN QUANT: CPT

## 2025-04-26 PROCEDURE — 80053 COMPREHEN METABOLIC PANEL: CPT

## 2025-04-26 PROCEDURE — 81003 URINALYSIS AUTO W/O SCOPE: CPT

## 2025-04-26 PROCEDURE — 700111 HCHG RX REV CODE 636 W/ 250 OVERRIDE (IP): Mod: JZ,UD | Performed by: EMERGENCY MEDICINE

## 2025-04-26 PROCEDURE — 85025 COMPLETE CBC W/AUTO DIFF WBC: CPT

## 2025-04-26 PROCEDURE — 93005 ELECTROCARDIOGRAM TRACING: CPT | Mod: TC | Performed by: EMERGENCY MEDICINE

## 2025-04-26 PROCEDURE — 71045 X-RAY EXAM CHEST 1 VIEW: CPT

## 2025-04-26 PROCEDURE — 96375 TX/PRO/DX INJ NEW DRUG ADDON: CPT

## 2025-04-26 PROCEDURE — 93005 ELECTROCARDIOGRAM TRACING: CPT | Mod: TC

## 2025-04-26 RX ORDER — DIPHENHYDRAMINE HYDROCHLORIDE 50 MG/ML
25 INJECTION, SOLUTION INTRAMUSCULAR; INTRAVENOUS ONCE
Status: COMPLETED | OUTPATIENT
Start: 2025-04-26 | End: 2025-04-26

## 2025-04-26 RX ORDER — KETOROLAC TROMETHAMINE 15 MG/ML
15 INJECTION, SOLUTION INTRAMUSCULAR; INTRAVENOUS ONCE
Status: COMPLETED | OUTPATIENT
Start: 2025-04-26 | End: 2025-04-26

## 2025-04-26 RX ADMIN — KETOROLAC TROMETHAMINE 15 MG: 15 INJECTION, SOLUTION INTRAMUSCULAR; INTRAVENOUS at 10:50

## 2025-04-26 RX ADMIN — DIPHENHYDRAMINE HYDROCHLORIDE 25 MG: 50 INJECTION, SOLUTION INTRAMUSCULAR; INTRAVENOUS at 10:49

## 2025-04-26 ASSESSMENT — LIFESTYLE VARIABLES
TOTAL SCORE: 0
CONSUMPTION TOTAL: INCOMPLETE
HAVE YOU EVER FELT YOU SHOULD CUT DOWN ON YOUR DRINKING: NO
HAVE PEOPLE ANNOYED YOU BY CRITICIZING YOUR DRINKING: NO
EVER FELT BAD OR GUILTY ABOUT YOUR DRINKING: NO
EVER HAD A DRINK FIRST THING IN THE MORNING TO STEADY YOUR NERVES TO GET RID OF A HANGOVER: NO
DO YOU DRINK ALCOHOL: NO
TOTAL SCORE: 0
TOTAL SCORE: 0

## 2025-04-26 ASSESSMENT — PAIN DESCRIPTION - PAIN TYPE
TYPE: ACUTE PAIN

## 2025-04-26 ASSESSMENT — FIBROSIS 4 INDEX: FIB4 SCORE: 0.53

## 2025-04-26 NOTE — ED PROVIDER NOTES
CHIEF COMPLAINT  Chief Complaint   Patient presents with    Chest Pain    Syncope       LIMITATION TO HISTORY   Select: none    HPI    Ronny Duong is a 39 y.o. male who presents to the Emergency Department for evaluation of chronic chest pain and arm pain, and new headaches onset three days ago. Patient reports that he has been having intermittent chest and arm pain for the past nine months, which has not changed much. Patient locates his chest pain to the center of the bottom of his diaphragm, and that pain is exacerbated when pressure is applied. He describes left arm pain that extends into his elbow, and he has some pain in between his middle and ring finger. He reports that he began having pain in the posterior aspect of his head where his neck meets his head for the past three days now, and has felt intermittently lightheaded. He denies any recent injuries, and says that he has not been doing too much. Patient has attempted to treat his neck and head pain with Tylenol but has not had any relief at home. Patient says that he has not been able to sleep or lay down much recently. He does not work right now. Patient is not currently established with a primary provider, but is able to be seen at [facility] as a walk in patient. His right eye is dead due to a previous injury at the age of [age]. Patient has been told that he has a history of Lupus at [facility].     OUTSIDE HISTORIAN(S):  Select: None.     EXTERNAL RECORDS REVIEWED  Select: Patient seen in the ED 7/15/2024 with central chest pain that extended into his left arm. He eloped at that time but did have a reassuring cardiac workup.     PAST MEDICAL HISTORY  Past Medical History:   Diagnosis Date    Asthma      .    SURGICAL HISTORY  History reviewed. No pertinent surgical history.      FAMILY HISTORY  History reviewed. No pertinent family history.       SOCIAL HISTORY  Social History     Tobacco Use    Smoking status: Some Days     Current packs/day: 0.50  "    Average packs/day: 0.5 packs/day for 19.0 years (9.5 ttl pk-yrs)     Types: Cigarettes    Smokeless tobacco: Never   Vaping Use    Vaping status: Never Used   Substance Use Topics    Alcohol use: No    Drug use: Yes     Types: Marijuana     Comment: marijuana         CURRENT MEDICATIONS  No current facility-administered medications on file prior to encounter.     Current Outpatient Medications on File Prior to Encounter   Medication Sig Dispense Refill    ondansetron (ZOFRAN ODT) 4 MG TABLET DISPERSIBLE Take 1 Tab by mouth every 8 hours as needed for Nausea/Vomiting. 10 Tab 0    loperamide (IMODIUM) 2 MG Cap Take 1 Cap by mouth 4 times a day as needed for Diarrhea. 30 Cap 0         ALLERGIES  No Known Allergies    PHYSICAL EXAM  VITAL SIGNS:BP (!) 156/75   Pulse 70   Temp 36.4 °C (97.6 °F) (Temporal)   Resp 16   Ht 1.88 m (6' 2\")   Wt 113 kg (250 lb)   SpO2 98%   BMI 32.10 kg/m²     Constitutional: Well-developed no acute distress   HENT: Normocephalic, Atraumatic, Bilateral external ears normal.  Eyes:  conjunctiva are normal.  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  Neck: Supple. Tender in the Paraspinous musculature in the insertion at the base of the skull. Non tender midline. Normal range of motion.   Cardiovascular: Regular rate and rhythm without murmurs gallops or rubs.   Thorax & Lungs: Chest wall tenderness. No respiratory distress. Breathing comfortably. Lungs are clear to auscultation bilaterally, there are no wheezes no rales.   Abdomen: Soft, non distended, non tender   Skin: Warm, Dry, No erythema,   Back: No tenderness, No CVA tenderness.  Musculoskeletal: No clubbing cyanosis or edema good range of motion   Neurologic: Alert & oriented x 3, normal sensation moving all extremities appears normal   Psychiatric: Affect normal, Judgment normal, Mood normal.     DIAGNOSTIC STUDIES / PROCEDURES    LABS  Results for orders placed or performed during the hospital " encounter of 04/26/25   CBC with Differential    Collection Time: 04/26/25 10:02 AM   Result Value Ref Range    WBC 7.4 4.8 - 10.8 K/uL    RBC 5.25 4.70 - 6.10 M/uL    Hemoglobin 16.6 14.0 - 18.0 g/dL    Hematocrit 46.4 42.0 - 52.0 %    MCV 88.4 81.4 - 97.8 fL    MCH 31.6 27.0 - 33.0 pg    MCHC 35.8 32.3 - 36.5 g/dL    RDW 38.3 35.9 - 50.0 fL    Platelet Count 277 164 - 446 K/uL    MPV 9.4 9.0 - 12.9 fL    Neutrophils-Polys 61.90 44.00 - 72.00 %    Lymphocytes 27.00 22.00 - 41.00 %    Monocytes 7.80 0.00 - 13.40 %    Eosinophils 1.90 0.00 - 6.90 %    Basophils 1.10 0.00 - 1.80 %    Immature Granulocytes 0.30 0.00 - 0.90 %    Nucleated RBC 0.00 0.00 - 0.20 /100 WBC    Neutrophils (Absolute) 4.61 1.82 - 7.42 K/uL    Lymphs (Absolute) 2.01 1.00 - 4.80 K/uL    Monos (Absolute) 0.58 0.00 - 0.85 K/uL    Eos (Absolute) 0.14 0.00 - 0.51 K/uL    Baso (Absolute) 0.08 0.00 - 0.12 K/uL    Immature Granulocytes (abs) 0.02 0.00 - 0.11 K/uL    NRBC (Absolute) 0.00 K/uL   Complete Metabolic Panel (CMP)    Collection Time: 04/26/25 10:02 AM   Result Value Ref Range    Sodium 138 135 - 145 mmol/L    Potassium 3.9 3.6 - 5.5 mmol/L    Chloride 105 96 - 112 mmol/L    Co2 21 20 - 33 mmol/L    Anion Gap 12.0 7.0 - 16.0    Glucose 110 (H) 65 - 99 mg/dL    Bun 14 8 - 22 mg/dL    Creatinine 1.10 0.50 - 1.40 mg/dL    Calcium 8.8 8.5 - 10.5 mg/dL    Correct Calcium 8.4 (L) 8.5 - 10.5 mg/dL    AST(SGOT) 26 12 - 45 U/L    ALT(SGPT) 33 2 - 50 U/L    Alkaline Phosphatase 70 30 - 99 U/L    Total Bilirubin 0.5 0.1 - 1.5 mg/dL    Albumin 4.5 3.2 - 4.9 g/dL    Total Protein 7.6 6.0 - 8.2 g/dL    Globulin 3.1 1.9 - 3.5 g/dL    A-G Ratio 1.5 g/dL   proBrain Natriuretic Peptide, NT (BNP)    Collection Time: 04/26/25 10:02 AM   Result Value Ref Range    NT-proBNP <36 0 - 125 pg/mL   Troponins NOW    Collection Time: 04/26/25 10:02 AM   Result Value Ref Range    Troponin T <6 6 - 19 ng/L   ESTIMATED GFR    Collection Time: 04/26/25 10:02 AM   Result Value  Ref Range    GFR (CKD-EPI) 88 >60 mL/min/1.73 m 2   EKG (NOW)    Collection Time: 25 10:29 AM   Result Value Ref Range    Report       [facility] Emergency Dept.    Test Date:  2025  Pt Name:    MARTIN MURPHY               Department: ER  MRN:        [id number]                      Room:  Gender:     Male                         Technician: [id number]  :        1985                   Requested By:ER TRIAGE PROTOCOL  Order #:    [id number]                    Reading MD: MOISES NICOLE MD    Measurements  Intervals                                Axis  Rate:       55                           P:          89  MN:         168                          QRS:        96  QRSD:       103                          T:          59  QT:         426  QTc:        408    Interpretive Statements  Sinus bradycardia  Borderline right axis deviation  Nonspecific T abnormalities, lateral leads  Baseline wander in lead(s) II,V2,V3  Compared to ECG 07/15/2024 12:12:30  T-wave abnormality now present  Sinus rhythm no longer present  Electronically Signed On 2025 10:29:26 PDT by MOISES NICOLE MD     URINE DRUG SCREEN    Collection Time: 25 11:09 AM   Result Value Ref Range    Amphetamines Urine Negative Negative    Barbiturates Negative Negative    Benzodiazepines Negative Negative    Cocaine Metabolite Negative Negative    Fentanyl, Urine Negative Negative    Methadone Negative Negative    Opiates Negative Negative    Oxycodone Negative Negative    Phencyclidine -Pcp Negative Negative    Propoxyphene Negative Negative    Cannabinoid Metab Positive (A) Negative   URINALYSIS    Collection Time: 25 11:09 AM    Specimen: Urine   Result Value Ref Range    Color Yellow     Character Clear     Specific Gravity 1.010 <1.035    Ph 5.5 5.0 - 8.0    Glucose Negative Negative mg/dL    Ketones Negative Negative mg/dL    Protein Negative Negative mg/dL    Bilirubin Negative Negative    Urobilinogen,  Urine 0.2 <=1.0 EU/dL    Nitrite Negative Negative    Leukocyte Esterase Negative Negative    Occult Blood Negative Negative    Micro Urine Req see below        EKG:   I have independently interpreted this EKG as detailed above.      RADIOLOGY  I have independently interpreted the diagnostic imaging associated with this visit and am waiting the final reading from the radiologist.   My preliminary interpretation is as follows: No acute infiltrates on chest x-ray      Radiologist interpretation:   DX-CHEST-PORTABLE (1 VIEW)   Final Result      No acute process.           COURSE & MEDICAL DECISION MAKING    ED COURSE:    ED Observation Status? No, The patient does not qualify for observation status     INTERVENTIONS BY ME:  Medications   ketorolac (Toradol) 15 MG/ML injection 15 mg (15 mg Intravenous Given 4/26/25 1050)   diphenhydrAMINE (Benadryl) injection 25 mg (25 mg Intravenous Given 4/26/25 1049)       10:15 AM - Patient seen and examined at bedside. He arrives today with nine months of chest and arm pain, and three days of a new pain in his posterior head and neck. Discussed plan of care, including obtaining lab work and imaging to evaluate today. Patient agrees to the plan of care. The patient will be medicated with Benadryl 25 mg and Toradol 15 mg. Ordered for EKG, Troponin now, BNP, CMP, CBC with differential, urine drug screen, DX-Chest to evaluate his symptoms.     12:14 PM - I reevaluated the patient at bedside, who says that he still has some chest pain that extends into his left arm. I informed him that his returned studies are all reassuring, and discussed available options to manage any ongoing pain. I discussed plan for discharge and follow up as outlined below. The patient is stable for discharge at this time and will return for any new or worsening symptoms. Patient verbalizes understanding and support with my plan for discharge.      INITIAL ASSESSMENT, COURSE AND PLAN  Care Narrative: Patient  presents for evaluation with a multitude of complaints the primary concern was his chest pain and his neck pain.  Clinically the neck pain is paraspinous muscular tenderness of the cervical spine.  Recommended range of motion exercises anti-inflammatories.  From the standpoint the chest pain has been continuous in nature for 7 months he was very concerned so I did do an EKG chest x-ray and basic laboratory studies.  They were all completely normal.  At this point I do not feel is cardiac in nature I do feel it is chest wall in nature.  Of recommended OTC medication such as Tylenol ibuprofen for pain control.  I did offer to write a prescription for Naprosyn however the patient does not want any prescription medications.  I did do a dose of IV Toradol and Benadryl.  He states that he feels too woozy from the Benadryl.  At this point of recommended for the patient to follow-up with his primary care physician for recheck and further outpatient treatment and care and return as needed.         ADDITIONAL PROBLEM LIST  Chest pain  Neck pain    DISPOSITION AND DISCUSSIONS  I have discussed management of the patient with the following physicians and CATRACHO's: None    Escalation of care considered, and ultimately not performed: None    Barriers to care at this time, including but not limited to: None.     Decision tools and prescription drugs considered including, but not limited to: As described above.       The patient will return for new or worsening symptoms and is stable at the time of discharge.    The patient is referred to a primary physician for blood pressure management, diabetic screening, and for all other preventative health concerns.    I reviewed prescription monitoring program for patient's narcotic use before prescribing a scheduled drug.The patient will not drink alcohol nor drive with prescribed medications      DISPOSITION:  Patient will be discharged home in stable condition.    FOLLOW UP:  [facility]  [facility]  [address] [facility] [address]  [phone]  Schedule an appointment as soon as possible for a visit   For further evaluation, Return if any symptoms worsen      OUTPATIENT MEDICATIONS:  Discharge Medication List as of 4/26/2025 12:34 PM           FINAL DIAGNOSIS  1. Chronic chest wall pain    2. Cervicogenic headache         Jordan CHAVEZ (Ra), am scribing for, and in the presence of, Brant Santos M.D..    Electronically signed by: Jordan Whitehead (Ra), 4/26/2025    IBrant M.D. personally performed the services described in this documentation, as scribed by Jordan Whitehead in my presence, and it is both accurate and complete.     Electronically signed by: Brant Santos M.D.,4:34 PM 04/26/25

## 2025-04-26 NOTE — ED NOTES
Medicated per erp's order, pt requested his urine to be tested for uds and infection. Orders obtained from erp.

## 2025-04-26 NOTE — ED NOTES
Pt removed the IV catheter himself before discharge. The tip of the catheter was inspected and intact.

## 2025-04-26 NOTE — ED TRIAGE NOTES
"Chief Complaint   Patient presents with    Chest Pain    Syncope     Pt BIB EMS to triage for chest pain x9 months and syncopal episode today. Pt denies head strike, says he was sitting down and passed out for 2 minutes. Pt says the chest pain is left sided radiating to his head.    Blood Pressure: (!) 156/75, Pulse: 70, Respiration: 16, Temperature: 36.4 °C (97.6 °F), Height: 188 cm (6' 2\"), Weight: 113 kg (250 lb), BMI (Calculated): 32.1, BSA (Calculated): 2.4, Pulse Oximetry: 98 %, O2 (LPM): 0    Pt is alert, oriented, and follows commands. Pt speaking in full sentences and responds appropriately to questions. No acute distress noted in triage and respirations are even and unlabored.      Pt placed in lobby and educated on triage process. Pt encouraged to alert staff for any changes in condition.   "

## 2025-04-29 ENCOUNTER — OFFICE VISIT (OUTPATIENT)
Dept: MEDICAL GROUP | Facility: MEDICAL CENTER | Age: 40
End: 2025-04-29
Attending: FAMILY MEDICINE
Payer: MEDICAID

## 2025-04-29 ENCOUNTER — PHARMACY VISIT (OUTPATIENT)
Dept: PHARMACY | Facility: MEDICAL CENTER | Age: 40
End: 2025-04-29
Payer: COMMERCIAL

## 2025-04-29 VITALS
BODY MASS INDEX: 31.83 KG/M2 | WEIGHT: 248 LBS | SYSTOLIC BLOOD PRESSURE: 118 MMHG | HEART RATE: 60 BPM | HEIGHT: 74 IN | RESPIRATION RATE: 16 BRPM | DIASTOLIC BLOOD PRESSURE: 68 MMHG | TEMPERATURE: 98.7 F | OXYGEN SATURATION: 97 %

## 2025-04-29 DIAGNOSIS — J45.20 MILD INTERMITTENT ASTHMA, UNSPECIFIED WHETHER COMPLICATED: ICD-10-CM

## 2025-04-29 DIAGNOSIS — F32.A DEPRESSION, UNSPECIFIED DEPRESSION TYPE: ICD-10-CM

## 2025-04-29 DIAGNOSIS — Z13.79 GENETIC TESTING: ICD-10-CM

## 2025-04-29 DIAGNOSIS — Z76.89 ENCOUNTER TO ESTABLISH CARE WITH NEW DOCTOR: ICD-10-CM

## 2025-04-29 DIAGNOSIS — R07.89 ATYPICAL CHEST PAIN: ICD-10-CM

## 2025-04-29 PROCEDURE — RXMED WILLOW AMBULATORY MEDICATION CHARGE: Performed by: FAMILY MEDICINE

## 2025-04-29 PROCEDURE — 99213 OFFICE O/P EST LOW 20 MIN: CPT | Performed by: FAMILY MEDICINE

## 2025-04-29 RX ORDER — BUDESONIDE AND FORMOTEROL FUMARATE DIHYDRATE 80; 4.5 UG/1; UG/1
2 AEROSOL RESPIRATORY (INHALATION) 2 TIMES DAILY
Qty: 10.2 G | Refills: 2 | Status: SHIPPED | OUTPATIENT
Start: 2025-04-29

## 2025-04-29 RX ORDER — FAMOTIDINE 40 MG/1
40 TABLET, FILM COATED ORAL DAILY
Qty: 60 TABLET | Refills: 2 | Status: SHIPPED | OUTPATIENT
Start: 2025-04-29

## 2025-04-29 RX ORDER — NAPROXEN 500 MG/1
500 TABLET ORAL 2 TIMES DAILY WITH MEALS
Qty: 60 TABLET | Refills: 2 | Status: SHIPPED | OUTPATIENT
Start: 2025-04-29

## 2025-04-29 RX ORDER — BUDESONIDE AND FORMOTEROL FUMARATE DIHYDRATE 80; 4.5 UG/1; UG/1
2 AEROSOL RESPIRATORY (INHALATION) 2 TIMES DAILY
COMMUNITY
Start: 2025-02-26 | End: 2025-04-29 | Stop reason: SDUPTHER

## 2025-04-29 ASSESSMENT — FIBROSIS 4 INDEX: FIB4 SCORE: 0.62

## 2025-04-30 NOTE — PROGRESS NOTES
Verbal consent was acquired by the patient to use [employer] ambient listening note generation during this visit.    Subjective:     CC:    Chief Complaint   Patient presents with    Establish Care       HISTORY OF THE PRESENT ILLNESS: Ronny Duong is a 39 y.o. male. This pleasant patient is here today to establish primary care with me and discuss the following issues.     Specialists   None     History of Present Illness  The patient presents to reestablish primary care.    Previous Care and ER Visit  He was previously under the care of a primary care physician at the [facility]. A few days prior, he visited the [facility] ER due to chest pain concerns. Although hospitalization was not deemed necessary, he was advised to follow up with primary care. The ER conducted blood work, including blood counts and a metabolic panel, cardiac testing with different enzymes, an EKG, and a urine test. The urine test indicated possible marijuana use, but was otherwise normal. A chest x-ray was also performed and was reassuring. The ER's findings did not indicate a heart attack or myocardial infarction, suggesting that his symptoms were unlikely to be heart-related. They recommended anti-inflammatories, suspecting a chest wall issue.    Chest Pain  He continues to experience chest pain, similar to the episode three days ago, and reports an increase in popping sounds. He describes his chest pain as intermittent, rating it as 10/10 earlier today, which has since decreased to 4/10. He expresses concern about potential bone or lung cancer, given his family history of bone cancer and his smoking habit. He has been on a regimen of anti-inflammatories for the past five days, taking two pills daily.  - Onset: Episode three days ago.  - Location: Chest.  - Duration: Intermittent.  - Character: Chest pain with popping sounds.  - Alleviating/Aggravating Factors: Anti-inflammatories.  - Severity: Rated 10/10 earlier today, decreased  to 4/10.    ?History of Lupus  He recalls a diagnosis of lupus from the [facility], for which he was prescribed steroids and antibiotics. These medications provided temporary relief, but the effects were short-lived. He reports no lab work was done at that time.    Previous Hospitalization  He was previously hospitalized in the ICU for pneumonia in 2016, during which he was advised to breathe deeply to expand his lungs.    Depression  He has a history of depression, for which he was previously on medication. However, he discontinued the medication due to concerns about potential side effects, including lightheadedness and chest pain. He has been managing his depression with marijuana use. He has found therapy beneficial and is open to resuming it if a suitable therapist can be found.  - Onset: History of depression.  - Alleviating/Aggravating Factors: Discontinued medication due to side effects; managing with marijuana use.  - Severity: Found therapy beneficial.    Asthma  He has been diagnosed with asthma, for which he is on albuterol and Symbicort. He admits to inconsistent use of these medications. He has never undergone formal lung function testing to confirm his asthma diagnosis.  - Alleviating/Aggravating Factors: Albuterol and Symbicort; inconsistent use.    Additional Information  He has no major surgical history. He has no known medication allergies. He is currently unemployed and lives with his partner and six cats. He has lost all his teeth and is interested in exploring options for dental implants.    SOCIAL HISTORY  The patient smokes about 10 cigarettes a day and has been smoking consistently since around age 16 or 17. He also smokes marijuana daily and has been using it consistently since about age 14. 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 He tried cocaine once at age 16 but did not continue its use. He has not consumed alcohol since before COVID-19, around  2017.    FAMILY HISTORY  His maternal grandmother  of bone cancer at age 56. His mother  of liver-related issues, possibly related to alcohol use. His father's whereabouts are unknown.      Allergies: Patient has no known allergies.      [facility] [place], [place] - [address]  [address] [place] [address]  Phone: [phone] Fax: [phone]      Current Outpatient Medications   Medication Sig Dispense Refill    budesonide-formoterol (SYMBICORT) 80-4.5 MCG/ACT Aerosol Inhale 2 Puffs 2 times a day. 10.2 g 2    naproxen (NAPROSYN) 500 MG Tab Take 1 Tablet by mouth 2 times a day with meals. 60 Tablet 2    famotidine (PEPCID) 40 MG Tab Take 1 Tablet by mouth every day. 60 Tablet 2    ondansetron (ZOFRAN ODT) 4 MG TABLET DISPERSIBLE Take 1 Tab by mouth every 8 hours as needed for Nausea/Vomiting. 10 Tab 0    loperamide (IMODIUM) 2 MG Cap Take 1 Cap by mouth 4 times a day as needed for Diarrhea. 30 Cap 0     No current facility-administered medications for this visit.       Past Medical History:   Diagnosis Date    Asthma     Depression        No past surgical history on file.    Family History   Problem Relation Age of Onset    Cancer Maternal Grandmother         Bone       Social History     Tobacco Use    Smoking status: Every Day     Current packs/day: 0.50     Average packs/day: 0.5 packs/day for 19.0 years (9.5 ttl pk-yrs)     Types: Cigarettes    Smokeless tobacco: Former     Types: Chew    Tobacco comments:     10 cig/day since age 16; chewing tobacco off and on in 20s   Vaping Use    Vaping status: Never Used   Substance Use Topics    Alcohol use: Not Currently     Comment: Last drink ; denies AUD    Drug use: Yes     Types: Marijuana     Comment: daily marijuana; consistently since age 14; prior light meth use (last use )     Social Drivers of Health     Alcohol Use: Not on file   Depression: Not on file   Financial Resource Strain: Not on file   Food Insecurity: Not  "on file   Housing Stability: Not on file   Intimate Partner Violence: Not on file   Physical Activity: Not on file   Social Connections: Not on file   Stress: Not on file   Tobacco Use: High Risk (4/29/2025)    Patient History     Smoking Tobacco Use: Every Day     Smokeless Tobacco Use: Former     Passive Exposure: Not on file   Transportation Needs: Not on file   Utilities: Not on file        Objective:     /68   Pulse 60   Temp 37.1 °C (98.7 °F)   Resp 16   Ht 1.88 m (6' 2\")   Wt 112 kg (248 lb)   SpO2 97%   BMI 31.84 kg/m²   Physical Exam  Constitutional: Alert, no distress  Skin: No rashes in visible areas  Eye: Conjunctiva clear, lids normal, +strabismus  Respiratory: Unlabored respiratory effort on room air, no cough, lungs clear to auscultation bilaterally  CV: RRR  MSK: Normal gait, moves all extremities  Psych: Alert and oriented x3, normal affect and mood      Assessment & Plan:   39 y.o. male with the following -    1. Mild intermittent asthma, unspecified whether complicated  budesonide-formoterol (SYMBICORT) 80-4.5 MCG/ACT Aerosol    PULMONARY FUNCTION TESTS -Test requested: Complete Pulmonary Function Test      2. Depression, unspecified depression type  Referral to Behavioral Health      3. Atypical chest pain  naproxen (NAPROSYN) 500 MG Tab    famotidine (PEPCID) 40 MG Tab    HEMOGLOBIN A1C    Lipid Profile      4. Genetic testing  Referral to Genetic Research Studies        Assessment & Plan  1. Atypical chest pain: Stable. Recent ER visit ruled out cardiac causes. Blood counts are within normal limits. Differential diagnosis includes costochondritis, gastrointestinal issues (acid reflux, indigestion, heartburn), and psychological factors (anxiety, panic attacks).  - Prescribe Naprosyn 500 mg twice daily with food for inflammation and pain.  - Prescribe famotidine 20 mg twice daily to prevent gastrointestinal upset and address potential GI-related symptoms.  - Monitor symptoms and " identify potential triggers.    2. Asthma: Prescribe albuterol and Symbicort inhalers. No formal lung function testing to confirm diagnosis.  - Order formal lung function test to confirm asthma diagnosis and rule out other lung conditions.    3. Depression: History of depression treated with Wellbutrin and Vraylar. Hesitation about medication due to side effects.  - Referral for individual counseling or therapy.    4. Health maintenance: Emphasis on lifestyle medicine including nutrition, regular exercise, healthy relationships, stress management, restful sleep, and avoiding risky substances.  - Review resources provided in [employer].  - Referral for genetic testing through "[employer] [place] to assess cancer risk.  - Order autoimmune screen to rule out lupus or other autoimmune conditions.  - Order cholesterol and diabetes screenings.    Follow-up  - Follow up in 3 to 6 months.    A comprehensive discussion regarding lifestyle medicine was conducted with the patient, emphasizing the importance of maintaining a healthy plant based diet, engaging in regular physical activity, maintaining healthy social relationships, managing stress, ensuring adequate restful sleep, and avoiding risky substances such as alcohol, drugs, and tobacco.     Return in about 3 months (around 7/29/2025) for chronic condition follow up, routine annual wellness exam.      Please be advised that this document was generated using voice recognition software. While I have made reasonable efforts to correct any obvious errors, there may still be grammatical or content inaccuracies that were not identified or corrected prior to finalization.

## 2025-05-01 NOTE — Clinical Note
REFERRAL APPROVAL NOTICE         Sent on May 1, 2025                   Ronny Duong  [address] [facility] [address]                   Dear Mr. Duong,    After a careful review of the medical information and benefit coverage, [facility] has processed your referral. See below for additional details.    If applicable, you must be actively enrolled with your insurance for coverage of the authorized service. If you have any questions regarding your coverage, please contact your insurance directly.    REFERRAL INFORMATION   Referral #:  [id number]  Referred-To Department    Referred-By Provider:  Pulmonary and Sleep Medicine    Lurdes Connell M.D.   Pulmonary Rehab [facility]      [address]  [address]  [facility] [address]  [phone] [id number]  [facility] [address]  [phone]    Referral Start Date:  04/29/2025  Referral End Date:   04/29/2026             SCHEDULING  If you do not already have an appointment, please call [phone] to make an appointment.     MORE INFORMATION  If you do not already have a [employer] account, sign up at: [email or web address]  You can access your medical information, make appointments, see lab results, billing information, and more.  If you have questions regarding this referral, please contact  the [facility] Referrals department at:             [phone]. Monday - Friday 8:00AM - 5:00PM.     Sincerely,    [facility]

## 2025-05-02 NOTE — Clinical Note
REFERRAL APPROVAL NOTICE         Sent on May 2, 2025                   Ronny Duong  [address] [facility] [address]                   Dear Mr. Duong,    After a careful review of the medical information and benefit coverage, [facility] has processed your referral. See below for additional details.    If applicable, you must be actively enrolled with your insurance for coverage of the authorized service. If you have any questions regarding your coverage, please contact your insurance directly.    REFERRAL INFORMATION   Referral #:  [id number]  Referred-To Provider    Referred-By Provider:  Behavioral Health    Lurdes Connell M.D.   [facility]      [address]  [address]  [facility] [address]  [phone] [address]  [facility] [address]  [phone]    Referral Start Date:  04/29/2025  Referral End Date:   04/29/2026             SCHEDULING  If you do not already have an appointment, please call [phone] to make an appointment.     MORE INFORMATION  If you do not already have a [employer] account, sign up at: [email or web address]  You can access your medical information, make appointments, see lab results, billing information, and more.  If you have questions regarding this referral, please contact  the [facility] Referrals department at:             [phone]. Monday - Friday 8:00AM - 5:00PM.     Sincerely,    [facility]

## 2025-06-16 ENCOUNTER — APPOINTMENT (OUTPATIENT)
Dept: MEDICAL GROUP | Facility: MEDICAL CENTER | Age: 40
End: 2025-06-16
Payer: MEDICAID